# Patient Record
(demographics unavailable — no encounter records)

---

## 2025-07-02 NOTE — PROCEDURE
[Endometrial Biopsy] : Endometrial biopsy [Post-Menop. Bleeding] : post-menopausal bleeding [Risks] : risks [Benefits] : benefits [Alternatives] : alternatives [Patient] : patient [Betadine] : Betadine [None] : none [Tenaculum] : Tenaculum [Required Dilation] : required dilation [Sounded to ___ cm] : sounded to [unfilled] ~Ucm [Scant] : scant [Sent to Pathology] : placed in buffered formalin and sent for pathology [Tolerated Well] : Patient tolerated the procedure well [No Complications] : No complications [de-identified] : pap

## 2025-07-02 NOTE — PLAN
[FreeTextEntry1] : D&C in-office for PMB:  pap and hpv done Consent obtained, pre-op evaluation Dx w/ irregular bleeding on chemo and immunotherapy Discussed risk, benefits and alternatives Discussed complications w/ pt including but not limited to infection, bleeding, allergic reaction, uterine perforation and pain Stabilization: Tenaculum used Cervix: Required dilation due to cervical stenosis Biopsy result in 5-7 days I will call pt with results She is advised to call me if fever, severe pain, heavy VB Pt demonstrates understanding  RTO PRN  Marialuisa MACKEY am scribing for and in the presence of SEBASTIÁN Carrillo M.D. in the following sections: HISTORY OF PRESENT ILLNESS, PAST MEDICAL/FAMILY/SOCIAL HISTORY, REVIEW OF SYSTEMS, PHYSICAL EXAM, ASSESSMENT/PLAN.

## 2025-07-02 NOTE — PHYSICAL EXAM
[Chaperoned Physical Exam] : A chaperone was present in the examining room during all aspects of the physical examination. [Appropriately responsive] : appropriately responsive [Alert] : alert [No Acute Distress] : no acute distress [Oriented x3] : oriented x3 [Vulvar Atrophy] : vulvar atrophy [Labia Majora] : normal [Labia Minora] : normal [Atrophy] : atrophy [Cervical Stenosis] : cervical stenosis [Normal] : normal [Uterine Adnexae] : normal [FreeTextEntry2] : leana aguilar [FreeTextEntry1] : radha

## 2025-07-02 NOTE — HISTORY OF PRESENT ILLNESS
[FreeTextEntry1] : 64 year old ARABELLA OBRIEN pt presents for acute visit for PMB.   She complains of abnormal uterine bleeding. She denies cramping, pain, no nausea or vomiting. Pt denies anything in vagina before this. 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  She reports normal urination, no dysuria or incontinence of urine. BM is normal per patient, no blood in stool or constipation/diarrhea. She denies abdominal and pelvic pain. PMH: neg genetic testing  She did D&C for AUB, had cervical stenosis. h-o cone bx Meds: Denies All: NKDA OBHx:  x3 GynHx: Hx of abnormal pap smear. No Hx of STI, fibroids, ovarian cysts, or pelvic infections.- cone bx SHx: cone biopsy in , tibia plateau fx FHx: Mother colon ca. Paternal cousin breast ca. Third cousin ovarian ca. Sister Stage 0 breast ca. Denies FHx of uterine, pancreatic, prostate cancer. All: NKDA Social: No alcohol, drug, or tobacco use, non-smoker.